# Patient Record
Sex: MALE | Race: WHITE
[De-identification: names, ages, dates, MRNs, and addresses within clinical notes are randomized per-mention and may not be internally consistent; named-entity substitution may affect disease eponyms.]

---

## 2018-07-02 ENCOUNTER — HOSPITAL ENCOUNTER (EMERGENCY)
Dept: HOSPITAL 68 - ERH | Age: 38
End: 2018-07-02
Payer: SELF-PAY

## 2018-07-02 VITALS — DIASTOLIC BLOOD PRESSURE: 82 MMHG | SYSTOLIC BLOOD PRESSURE: 127 MMHG

## 2018-07-02 DIAGNOSIS — K52.9: Primary | ICD-10-CM

## 2018-07-02 LAB
ABSOLUTE GRANULOCYTE CT: 4.8 /CUMM (ref 1.4–6.5)
BASOPHILS # BLD: 0.1 /CUMM (ref 0–0.2)
BASOPHILS NFR BLD: 0.6 % (ref 0–2)
EOSINOPHIL # BLD: 0.1 /CUMM (ref 0–0.7)
EOSINOPHIL NFR BLD: 1.1 % (ref 0–5)
ERYTHROCYTE [DISTWIDTH] IN BLOOD BY AUTOMATED COUNT: 12.4 % (ref 11.5–14.5)
GRANULOCYTES NFR BLD: 51.8 % (ref 42.2–75.2)
HCT VFR BLD CALC: 43.1 % (ref 42–52)
LYMPHOCYTES # BLD: 3 /CUMM (ref 1.2–3.4)
MCH RBC QN AUTO: 30.1 PG (ref 27–31)
MCHC RBC AUTO-ENTMCNC: 34.4 G/DL (ref 33–37)
MCV RBC AUTO: 87.6 FL (ref 80–94)
MONOCYTES # BLD: 1.4 /CUMM (ref 0.1–0.6)
PLATELET # BLD: 287 /CUMM (ref 130–400)
PMV BLD AUTO: 8.6 FL (ref 7.4–10.4)
RED BLOOD CELL CT: 4.93 /CUMM (ref 4.7–6.1)
WBC # BLD AUTO: 9.3 /CUMM (ref 4.8–10.8)

## 2018-07-02 NOTE — ED GI/GU/ABDOMINAL COMPLAINT
History of Present Illness
 
General
Chief Complaint: Nausea, Vomiting, Diarrhea
Stated Complaint: PT C/C MULTI +V/+D X'S 24 HRS
Source: patient
Exam Limitations: no limitations
 
Vital Signs & Intake/Output
Vital Signs & Intake/Output
 Vital Signs
 
 
Date Time Temp Pulse Resp B/P B/P Pulse O2 O2 Flow FiO2
 
     Mean Ox Delivery Rate 
 
07/02 0145 97.3 90 22 135/80  98 Room Air  
 
 
 
Allergies
Coded Allergies:
morphine (RASH 07/02/18)
 
Reconcile Medications
Hyoscyamine (Levsin) 0.125 MG TABLET   1-2 TAB PO Q6P PRN ABDOMINAL CRAMPS
Ondansetron (Zofran Odt) 4 MG TAB.RAPDIS   1 TAB SL Q6 PRN NAUSEA/VOMITING
 
Triage Note:
PER PT CO VOMITING X 24 HRS AND SOME DIARRHEA
 DENIES ANY TAINTED FOOD
 NO FEVERS
 CO ABD PAIN INTERMITTANT SHARP IN NATURE
Triage Nurses Notes Reviewed? yes
HPI:
Patient presents for evaluation of diffuse abdominal pain, Oddi aches vomiting 
and diarrhea.  Patient states that symptoms began rather abruptly about 24 hours
ago.  He states he had a similar episode associated with food poisoning but 
denies any suspicious meals.  He denies any associated fever, cold symptoms, 
rashes, travel outside the country or bleeding.  He tried Motrin without relief.
 Nothing seems to make the pain better or worse.
 
Past History
 
Travel History
Traveled to [place] past 21 day No
 
Medical History
Any Pertinent Medical History? see below for history
Neurological: NONE
Cardiovascular: NONE
Respiratory: NONE
Gastrointestinal: NONE
Hepatic: NONE
Renal: NONE
Musculoskeletal: NONE
Psychiatric: NONE
Endocrine: NONE
 
Surgical History
Surgical History: non-contributory
 
Psychosocial History
What is your primary language English
Tobacco Use: Never used
 
Family History
Hx Contributory? No
 
Review of Systems
 
Review of Systems
Constitutional:
Reports: no symptoms. 
EENTM:
Reports: no symptoms. 
Respiratory:
Reports: no symptoms. 
Cardiovascular:
Reports: no symptoms. 
GI:
Reports: see HPI. 
Genitourinary:
Reports: no symptoms. 
Musculoskeletal:
Reports: no symptoms. 
Skin:
Reports: no symptoms. 
Neurological/Psychological:
Reports: no symptoms. 
Hematologic/Endocrine:
Reports: no symptoms. 
Immunologic/Allergic:
Reports: no symptoms. 
All Other Systems: Reviewed and Negative
 
Physical Exam
 
Physical Exam
Gastrointestinal: SEE BELOW
Comments:
Gen.: Well-nourished, well-developed, no acute respiratory distress.
Head: Normocephalic, atraumatic.
Eyes: Normal inspection bilaterally
Ears: Normal inspection bilaterally
Nose: Normal inspection
Throat/mouth : Moist mucosa 
Neck: Supple, full range of motion, no goiter
Heart: Regular rate and rhythm, no murmurs rubs or gallops
Lungs: Clear to auscultation bilaterally with normal air entry
Chest: Nontender
Back: Normal range of motion
Abdomen: Soft, mild diffuse tenderness without rebound or guarding (greatest at 
the left upper quadrant), nondistended, normal bowel sounds
Extremities: Normal range of motion grossly, equal radial pulses, no cyanosis 
clubbing or edema
Neurologic: Cranial nerves grossly intact, speech is clear
Skin: warm and dry
Psychiatric: Calm, cooperative, no apparent delusions or hallucinations
 
 
Core Measures
ACS in differential dx? No
Sepsis Present: No
Sepsis Focused Exam Completed? No
 
Progress
Differential Diagnosis: FOOD POISONING, GASTROENTERITIS, PEPTIC ULCER DISEASE, 
BILIARY COLIC
Plan of Care:
 Orders
 
 
Procedure Date/time Status
 
LIPASE 07/02 0157 Complete
 
COMPREHENSIVE METABOLIC PANEL 07/02 0157 Complete
 
CBC WITHOUT DIFFERENTIAL 07/02 0157 Complete
 
AMYLASE 07/02 0157 Complete
 
 
 Laboratory Tests
 
 
07/02/18 0200:
Anion Gap 10, Estimated GFR > 60, BUN/Creatinine Ratio 30.0  H, Glucose 107  H, 
Calcium 9.3, Total Bilirubin 0.4, AST 27, ALT 41, Alkaline Phosphatase 68, Total
Protein 7.2, Albumin 4.1, Globulin 3.1, Albumin/Globulin Ratio 1.3, Amylase 59, 
Lipase 92, CBC w Diff NO MAN DIFF REQ, RBC 4.93, MCV 87.6, MCH 30.1, MCHC 34.4, 
RDW 12.4, MPV 8.6, Gran % 51.8, Lymphocytes % 32.0, Monocytes % 14.5  H, 
Eosinophils % 1.1, Basophils % 0.6, Absolute Granulocytes 4.8, Absolute 
Lymphocytes 3.0, Absolute Monocytes 1.4  H, Absolute Eosinophils 0.1, Absolute 
Basophils 0.1
 
Initial ED EKG: none
Comments:
7/2/2018 3:53:56 AM abdomen is feeling considerably better and appears 
comfortable currently.  He feels good enough to return home on symptomatic 
medications.
 
Departure
 
Departure
Disposition: HOME OR SELF CARE
Condition: Stable
Clinical Impression
Primary Impression: Gastroenteritis
Referrals:
Patient Has No Primary Care Dr (PCP/Family)
 
Additional Instructions:
Zofran as needed for nausea or vomiting.  Levsin as needed for abdominal pain.  
Clear liquid diet and advance as tolerated.  Follow-up with your primary care 
physician if not improving over the next 48-72 hours.  Return if any concerns or
sudden worsening.
 
If you do not currently have a primary care physician then please contact the 
[facility] at the following phone number: [phone].
 
Please note that there might be incidental findings in your evaluation that are 
unrelated to the current emergency department visit.  Please notify your primary
care doctor about this emergency department visit in order to obtain and review 
all of the testing performed so that these incidental findings can be monitored 
as needed.
 
If you had an x-ray performed, please understand that some fractures or other 
findings may not be seen on the initial set of x-rays.  If your symptoms persist
you might need a repeat set of x-rays to check for such a fracture.
 
If you had a laceration evaluated, please understand that foreign bodies such as
glass or wood may not be visible to the naked eye or on plain x-rays.  If the 
wound becomes red, swollen, increasingly more painful or if there is any 
drainage from the wound, please have it reevaluated by a physician for the 
possibility of a retained foreign body.
 
*****If you're unable to follow up as outlined in the discharge instructions 
please return to the emergency department.******
 
Thank you for choosing the [facility] for your care.
It was a pleasure to serve you today.
 
Boston Casanova M.D.
[facility]
 
Departure Forms:
Customer Survey
General Discharge Information
Prescriptions:
Current Visit Scripts
Ondansetron (Zofran Odt) 1 TAB SL Q6 PRN NAUSEA/VOMITING 
     #10 TAB 
 
Hyoscyamine (Levsin) 1-2 TAB PO Q6P PRN ABDOMINAL CRAMPS 
     #20 TAB